# Patient Record
Sex: FEMALE | Race: WHITE | NOT HISPANIC OR LATINO | Employment: OTHER | URBAN - METROPOLITAN AREA
[De-identification: names, ages, dates, MRNs, and addresses within clinical notes are randomized per-mention and may not be internally consistent; named-entity substitution may affect disease eponyms.]

---

## 2017-04-05 ENCOUNTER — TRANSCRIBE ORDERS (OUTPATIENT)
Dept: ADMINISTRATIVE | Facility: HOSPITAL | Age: 62
End: 2017-04-05

## 2017-04-05 DIAGNOSIS — Z12.31 VISIT FOR SCREENING MAMMOGRAM: Primary | ICD-10-CM

## 2017-04-24 ENCOUNTER — HOSPITAL ENCOUNTER (OUTPATIENT)
Dept: MAMMOGRAPHY | Facility: HOSPITAL | Age: 62
Discharge: HOME | End: 2017-04-24
Attending: OBSTETRICS & GYNECOLOGY | Admitting: OBSTETRICS & GYNECOLOGY

## 2017-04-24 DIAGNOSIS — Z12.31 VISIT FOR SCREENING MAMMOGRAM: ICD-10-CM

## 2017-04-24 PROCEDURE — 77063 BREAST TOMOSYNTHESIS BI: CPT

## 2017-04-24 PROCEDURE — G0202 SCR MAMMO BI INCL CAD: HCPCS

## 2017-04-24 PROCEDURE — 77063 BREAST TOMOSYNTHESIS BI: CPT | Performed by: RADIOLOGY

## 2017-04-24 PROCEDURE — 77067 SCR MAMMO BI INCL CAD: CPT | Performed by: RADIOLOGY

## 2018-10-04 ENCOUNTER — TRANSCRIBE ORDERS (OUTPATIENT)
Dept: ADMINISTRATIVE | Facility: HOSPITAL | Age: 63
End: 2018-10-04

## 2018-10-04 DIAGNOSIS — Z12.31 VISIT FOR SCREENING MAMMOGRAM: Primary | ICD-10-CM

## 2018-11-16 ENCOUNTER — HOSPITAL ENCOUNTER (OUTPATIENT)
Dept: MAMMOGRAPHY | Facility: HOSPITAL | Age: 63
Discharge: HOME | End: 2018-11-16
Attending: OBSTETRICS & GYNECOLOGY | Admitting: OBSTETRICS & GYNECOLOGY

## 2018-11-16 DIAGNOSIS — Z12.31 VISIT FOR SCREENING MAMMOGRAM: ICD-10-CM

## 2018-11-16 PROCEDURE — 77067 SCR MAMMO BI INCL CAD: CPT | Performed by: RADIOLOGY

## 2018-11-16 PROCEDURE — 77063 BREAST TOMOSYNTHESIS BI: CPT | Performed by: RADIOLOGY

## 2018-11-16 PROCEDURE — 77067 SCR MAMMO BI INCL CAD: CPT

## 2018-11-16 PROCEDURE — 77063 BREAST TOMOSYNTHESIS BI: CPT

## 2020-02-10 ENCOUNTER — TRANSCRIBE ORDERS (OUTPATIENT)
Dept: ADMINISTRATIVE | Facility: HOSPITAL | Age: 65
End: 2020-02-10

## 2020-02-10 DIAGNOSIS — Z12.31 VISIT FOR SCREENING MAMMOGRAM: Primary | ICD-10-CM

## 2020-02-27 ENCOUNTER — TRANSCRIBE ORDERS (OUTPATIENT)
Dept: ADMINISTRATIVE | Facility: HOSPITAL | Age: 65
End: 2020-02-27

## 2020-02-27 DIAGNOSIS — Z13.820 SCREENING FOR OSTEOPOROSIS: Primary | ICD-10-CM

## 2020-03-04 ENCOUNTER — TRANSCRIBE ORDERS (OUTPATIENT)
Dept: LAB | Facility: HOSPITAL | Age: 65
End: 2020-03-04

## 2020-03-04 ENCOUNTER — LAB (OUTPATIENT)
Dept: LAB | Facility: HOSPITAL | Age: 65
End: 2020-03-04

## 2020-03-04 DIAGNOSIS — N95.1 SYMPTOMATIC MENOPAUSAL OR FEMALE CLIMACTERIC STATES: ICD-10-CM

## 2020-03-04 DIAGNOSIS — I10 ESSENTIAL HYPERTENSION, MALIGNANT: ICD-10-CM

## 2020-03-04 DIAGNOSIS — I51.9 MYXEDEMA HEART DISEASE: ICD-10-CM

## 2020-03-04 DIAGNOSIS — R73.9 BLOOD GLUCOSE ELEVATED: ICD-10-CM

## 2020-03-04 DIAGNOSIS — E66.8 OBESITY OF ENDOCRINE ORIGIN: ICD-10-CM

## 2020-03-04 DIAGNOSIS — E78.2 MIXED HYPERLIPIDEMIA: ICD-10-CM

## 2020-03-04 DIAGNOSIS — E03.9 MYXEDEMA HEART DISEASE: ICD-10-CM

## 2020-03-04 DIAGNOSIS — E66.8 OBESITY OF ENDOCRINE ORIGIN: Primary | ICD-10-CM

## 2020-03-04 LAB
25(OH)D3 SERPL-MCNC: 49.6 NG/ML (ref 30–100)
ALBUMIN SERPL-MCNC: 4.3 G/DL (ref 3.5–5.2)
ALBUMIN/GLOB SERPL: 1.7 G/DL
ALP SERPL-CCNC: 66 U/L (ref 39–117)
ALT SERPL W P-5'-P-CCNC: 17 U/L (ref 1–33)
ANION GAP SERPL CALCULATED.3IONS-SCNC: 13.7 MMOL/L (ref 5–15)
AST SERPL-CCNC: 20 U/L (ref 1–32)
BILIRUB SERPL-MCNC: 0.4 MG/DL (ref 0.2–1.2)
BUN BLD-MCNC: 15 MG/DL (ref 8–23)
BUN/CREAT SERPL: 22.7 (ref 7–25)
CALCIUM SPEC-SCNC: 9.8 MG/DL (ref 8.6–10.5)
CHLORIDE SERPL-SCNC: 102 MMOL/L (ref 98–107)
CHOLEST SERPL-MCNC: 239 MG/DL (ref 0–200)
CO2 SERPL-SCNC: 25.3 MMOL/L (ref 22–29)
CREAT BLD-MCNC: 0.66 MG/DL (ref 0.57–1)
DEPRECATED RDW RBC AUTO: 42 FL (ref 37–54)
ERYTHROCYTE [DISTWIDTH] IN BLOOD BY AUTOMATED COUNT: 12.1 % (ref 12.3–15.4)
ESTRADIOL SERPL HS-MCNC: 48.7 PG/ML
GFR SERPL CREATININE-BSD FRML MDRD: 90 ML/MIN/1.73
GLOBULIN UR ELPH-MCNC: 2.5 GM/DL
GLUCOSE BLD-MCNC: 89 MG/DL (ref 65–99)
HBA1C MFR BLD: 5.49 % (ref 4.8–5.6)
HCT VFR BLD AUTO: 39 % (ref 34–46.6)
HCYS SERPL-MCNC: 6.4 UMOL/L (ref 0–15)
HDLC SERPL-MCNC: 82 MG/DL (ref 40–60)
HGB BLD-MCNC: 13.7 G/DL (ref 12–15.9)
LDLC SERPL CALC-MCNC: 145 MG/DL (ref 0–100)
LDLC/HDLC SERPL: 1.77 {RATIO}
MCH RBC QN AUTO: 33.3 PG (ref 26.6–33)
MCHC RBC AUTO-ENTMCNC: 35.1 G/DL (ref 31.5–35.7)
MCV RBC AUTO: 94.9 FL (ref 79–97)
PLATELET # BLD AUTO: 280 10*3/MM3 (ref 140–450)
PMV BLD AUTO: 10 FL (ref 6–12)
POTASSIUM BLD-SCNC: 5.1 MMOL/L (ref 3.5–5.2)
PROGEST SERPL-MCNC: 0.26 NG/ML
PROT SERPL-MCNC: 6.8 G/DL (ref 6–8.5)
RBC # BLD AUTO: 4.11 10*6/MM3 (ref 3.77–5.28)
SODIUM BLD-SCNC: 141 MMOL/L (ref 136–145)
T3FREE SERPL-MCNC: 2.45 PG/ML (ref 2–4.4)
T4 FREE SERPL-MCNC: 1.32 NG/DL (ref 0.93–1.7)
TESTOST SERPL-MCNC: 18 NG/DL (ref 2.9–40.8)
TRIGL SERPL-MCNC: 60 MG/DL (ref 0–150)
TSH SERPL DL<=0.05 MIU/L-ACNC: 1.95 UIU/ML (ref 0.27–4.2)
VLDLC SERPL-MCNC: 12 MG/DL (ref 5–40)
WBC NRBC COR # BLD: 7.32 10*3/MM3 (ref 3.4–10.8)

## 2020-03-04 PROCEDURE — 36415 COLL VENOUS BLD VENIPUNCTURE: CPT | Performed by: OBSTETRICS & GYNECOLOGY

## 2020-03-04 PROCEDURE — 84443 ASSAY THYROID STIM HORMONE: CPT

## 2020-03-04 PROCEDURE — 84144 ASSAY OF PROGESTERONE: CPT | Performed by: OBSTETRICS & GYNECOLOGY

## 2020-03-04 PROCEDURE — 83525 ASSAY OF INSULIN: CPT

## 2020-03-04 PROCEDURE — 86376 MICROSOMAL ANTIBODY EACH: CPT

## 2020-03-04 PROCEDURE — 86800 THYROGLOBULIN ANTIBODY: CPT

## 2020-03-04 PROCEDURE — 80053 COMPREHEN METABOLIC PANEL: CPT

## 2020-03-04 PROCEDURE — 83036 HEMOGLOBIN GLYCOSYLATED A1C: CPT | Performed by: OBSTETRICS & GYNECOLOGY

## 2020-03-04 PROCEDURE — 83721 ASSAY OF BLOOD LIPOPROTEIN: CPT | Performed by: OBSTETRICS & GYNECOLOGY

## 2020-03-04 PROCEDURE — 83090 ASSAY OF HOMOCYSTEINE: CPT

## 2020-03-04 PROCEDURE — 82627 DEHYDROEPIANDROSTERONE: CPT

## 2020-03-04 PROCEDURE — 82679 ASSAY OF ESTRONE: CPT

## 2020-03-04 PROCEDURE — 85027 COMPLETE CBC AUTOMATED: CPT | Performed by: OBSTETRICS & GYNECOLOGY

## 2020-03-04 PROCEDURE — 82306 VITAMIN D 25 HYDROXY: CPT | Performed by: OBSTETRICS & GYNECOLOGY

## 2020-03-04 PROCEDURE — 82670 ASSAY OF TOTAL ESTRADIOL: CPT

## 2020-03-04 PROCEDURE — 84481 FREE ASSAY (FT-3): CPT

## 2020-03-04 PROCEDURE — 80061 LIPID PANEL: CPT

## 2020-03-04 PROCEDURE — 84439 ASSAY OF FREE THYROXINE: CPT | Performed by: OBSTETRICS & GYNECOLOGY

## 2020-03-04 PROCEDURE — 84402 ASSAY OF FREE TESTOSTERONE: CPT

## 2020-03-04 PROCEDURE — 84482 T3 REVERSE: CPT

## 2020-03-04 PROCEDURE — 84403 ASSAY OF TOTAL TESTOSTERONE: CPT

## 2020-03-06 LAB
DHEA-S SERPL-MCNC: 165.5 UG/DL (ref 29.4–220.5)
T3REVERSE SERPL-MCNC: NORMAL PG/ML
TESTOST FREE SERPL-MCNC: NORMAL PG/ML
THYROGLOB AB SERPL-ACNC: <1 IU/ML (ref 0–0.9)
THYROPEROXIDASE AB SERPL-ACNC: 8 IU/ML (ref 0–34)

## 2020-03-08 LAB — INSULIN SERPL-ACNC: 1.6 UIU/ML

## 2020-03-09 LAB — ESTRONE SERPL-MCNC: 97 PG/ML

## 2020-03-10 LAB — REF LAB TEST RESULTS: NORMAL

## 2020-04-17 ENCOUNTER — APPOINTMENT (OUTPATIENT)
Dept: MAMMOGRAPHY | Facility: HOSPITAL | Age: 65
End: 2020-04-17

## 2020-04-29 ENCOUNTER — APPOINTMENT (OUTPATIENT)
Dept: MAMMOGRAPHY | Facility: HOSPITAL | Age: 65
End: 2020-04-29

## 2020-05-01 ENCOUNTER — APPOINTMENT (OUTPATIENT)
Dept: BONE DENSITY | Facility: HOSPITAL | Age: 65
End: 2020-05-01

## 2020-07-08 ENCOUNTER — HOSPITAL ENCOUNTER (OUTPATIENT)
Dept: MAMMOGRAPHY | Facility: HOSPITAL | Age: 65
Discharge: HOME | End: 2020-07-08
Admitting: OBSTETRICS & GYNECOLOGY

## 2020-07-08 DIAGNOSIS — Z12.31 VISIT FOR SCREENING MAMMOGRAM: ICD-10-CM

## 2020-07-08 PROCEDURE — 77067 SCR MAMMO BI INCL CAD: CPT | Performed by: RADIOLOGY

## 2020-07-08 PROCEDURE — 77063 BREAST TOMOSYNTHESIS BI: CPT | Performed by: RADIOLOGY

## 2020-07-08 PROCEDURE — 77063 BREAST TOMOSYNTHESIS BI: CPT

## 2020-07-08 PROCEDURE — 77067 SCR MAMMO BI INCL CAD: CPT

## 2020-08-06 ENCOUNTER — HOSPITAL ENCOUNTER (OUTPATIENT)
Dept: BONE DENSITY | Facility: HOSPITAL | Age: 65
Discharge: HOME | End: 2020-08-06
Admitting: OBSTETRICS & GYNECOLOGY

## 2020-08-06 DIAGNOSIS — Z13.820 SCREENING FOR OSTEOPOROSIS: ICD-10-CM

## 2020-08-06 PROCEDURE — 77080 DXA BONE DENSITY AXIAL: CPT

## 2020-12-15 ENCOUNTER — LAB (OUTPATIENT)
Dept: LAB | Facility: HOSPITAL | Age: 65
End: 2020-12-15

## 2020-12-15 ENCOUNTER — TRANSCRIBE ORDERS (OUTPATIENT)
Dept: LAB | Facility: HOSPITAL | Age: 65
End: 2020-12-15

## 2020-12-15 DIAGNOSIS — N95.1 SYMPTOMATIC MENOPAUSAL OR FEMALE CLIMACTERIC STATES: ICD-10-CM

## 2020-12-15 DIAGNOSIS — N95.1 SYMPTOMATIC MENOPAUSAL OR FEMALE CLIMACTERIC STATES: Primary | ICD-10-CM

## 2020-12-15 LAB — TESTOST SERPL-MCNC: 31.5 NG/DL (ref 2.9–40.8)

## 2020-12-15 PROCEDURE — 36415 COLL VENOUS BLD VENIPUNCTURE: CPT

## 2020-12-15 PROCEDURE — 84403 ASSAY OF TOTAL TESTOSTERONE: CPT

## 2021-03-17 ENCOUNTER — TRANSCRIBE ORDERS (OUTPATIENT)
Dept: LAB | Facility: HOSPITAL | Age: 66
End: 2021-03-17

## 2021-03-17 ENCOUNTER — LAB (OUTPATIENT)
Dept: LAB | Facility: HOSPITAL | Age: 66
End: 2021-03-17

## 2021-03-17 DIAGNOSIS — N95.1 SYMPTOMATIC MENOPAUSAL OR FEMALE CLIMACTERIC STATES: ICD-10-CM

## 2021-03-17 DIAGNOSIS — N95.1 SYMPTOMATIC MENOPAUSAL OR FEMALE CLIMACTERIC STATES: Primary | ICD-10-CM

## 2021-03-17 LAB
ESTRADIOL SERPL HS-MCNC: 50.4 PG/ML
TESTOST SERPL-MCNC: 22.6 NG/DL (ref 2.9–40.8)

## 2021-03-17 PROCEDURE — 36415 COLL VENOUS BLD VENIPUNCTURE: CPT | Performed by: OBSTETRICS & GYNECOLOGY

## 2021-03-17 PROCEDURE — 84403 ASSAY OF TOTAL TESTOSTERONE: CPT

## 2021-03-17 PROCEDURE — 82670 ASSAY OF TOTAL ESTRADIOL: CPT | Performed by: OBSTETRICS & GYNECOLOGY

## 2021-03-23 ENCOUNTER — TRANSCRIBE ORDERS (OUTPATIENT)
Dept: LAB | Facility: HOSPITAL | Age: 66
End: 2021-03-23

## 2021-03-23 ENCOUNTER — LAB (OUTPATIENT)
Dept: LAB | Facility: HOSPITAL | Age: 66
End: 2021-03-23

## 2021-03-23 DIAGNOSIS — N95.1 SYMPTOMATIC MENOPAUSAL OR FEMALE CLIMACTERIC STATES: Primary | ICD-10-CM

## 2021-03-23 PROCEDURE — 82670 ASSAY OF TOTAL ESTRADIOL: CPT | Performed by: OBSTETRICS & GYNECOLOGY

## 2021-03-23 PROCEDURE — 36415 COLL VENOUS BLD VENIPUNCTURE: CPT | Performed by: OBSTETRICS & GYNECOLOGY

## 2021-03-24 LAB — ESTRADIOL SERPL HS-MCNC: 47.7 PG/ML

## 2021-07-28 ENCOUNTER — TRANSCRIBE ORDERS (OUTPATIENT)
Dept: ADMINISTRATIVE | Facility: HOSPITAL | Age: 66
End: 2021-07-28

## 2021-07-28 DIAGNOSIS — Z12.31 VISIT FOR SCREENING MAMMOGRAM: Primary | ICD-10-CM

## 2021-08-13 ENCOUNTER — HOSPITAL ENCOUNTER (OUTPATIENT)
Dept: MAMMOGRAPHY | Facility: HOSPITAL | Age: 66
Discharge: HOME | End: 2021-08-13
Admitting: OBSTETRICS & GYNECOLOGY

## 2021-08-13 DIAGNOSIS — Z12.31 VISIT FOR SCREENING MAMMOGRAM: ICD-10-CM

## 2021-08-13 PROCEDURE — 77063 BREAST TOMOSYNTHESIS BI: CPT

## 2021-08-13 PROCEDURE — 77067 SCR MAMMO BI INCL CAD: CPT

## 2021-08-13 PROCEDURE — 77063 BREAST TOMOSYNTHESIS BI: CPT | Performed by: RADIOLOGY

## 2021-08-13 PROCEDURE — 77067 SCR MAMMO BI INCL CAD: CPT | Performed by: RADIOLOGY

## 2022-07-19 ENCOUNTER — TRANSCRIBE ORDERS (OUTPATIENT)
Dept: ADMINISTRATIVE | Facility: HOSPITAL | Age: 67
End: 2022-07-19

## 2022-07-19 DIAGNOSIS — Z12.31 VISIT FOR SCREENING MAMMOGRAM: Primary | ICD-10-CM

## 2022-08-23 ENCOUNTER — HOSPITAL ENCOUNTER (OUTPATIENT)
Dept: MAMMOGRAPHY | Facility: HOSPITAL | Age: 67
Discharge: HOME | End: 2022-08-23
Admitting: OBSTETRICS & GYNECOLOGY

## 2022-08-23 DIAGNOSIS — Z12.31 VISIT FOR SCREENING MAMMOGRAM: ICD-10-CM

## 2022-08-23 PROCEDURE — 77063 BREAST TOMOSYNTHESIS BI: CPT

## 2022-08-23 PROCEDURE — 77067 SCR MAMMO BI INCL CAD: CPT

## 2022-08-23 PROCEDURE — 77067 SCR MAMMO BI INCL CAD: CPT | Performed by: RADIOLOGY

## 2022-08-23 PROCEDURE — 77063 BREAST TOMOSYNTHESIS BI: CPT | Performed by: RADIOLOGY

## 2023-03-20 ENCOUNTER — OFFICE (OUTPATIENT)
Dept: URBAN - METROPOLITAN AREA PATHOLOGY 4 | Facility: PATHOLOGY | Age: 68
End: 2023-03-20

## 2023-03-20 ENCOUNTER — AMBULATORY SURGICAL CENTER (OUTPATIENT)
Dept: URBAN - METROPOLITAN AREA SURGERY 10 | Facility: SURGERY | Age: 68
End: 2023-03-20

## 2023-03-20 DIAGNOSIS — K64.1 SECOND DEGREE HEMORRHOIDS: ICD-10-CM

## 2023-03-20 DIAGNOSIS — D12.0 BENIGN NEOPLASM OF CECUM: ICD-10-CM

## 2023-03-20 DIAGNOSIS — K57.30 DIVERTICULOSIS OF LARGE INTESTINE WITHOUT PERFORATION OR ABS: ICD-10-CM

## 2023-03-20 DIAGNOSIS — D12.5 BENIGN NEOPLASM OF SIGMOID COLON: ICD-10-CM

## 2023-03-20 DIAGNOSIS — Z12.11 ENCOUNTER FOR SCREENING FOR MALIGNANT NEOPLASM OF COLON: ICD-10-CM

## 2023-03-20 PROCEDURE — 88305 TISSUE EXAM BY PATHOLOGIST: CPT | Performed by: INTERNAL MEDICINE

## 2023-03-20 PROCEDURE — 45385 COLONOSCOPY W/LESION REMOVAL: CPT | Mod: PT | Performed by: INTERNAL MEDICINE

## 2023-03-22 PROBLEM — Z12.11 ENCOUNTER FOR COLONOSCOPY DUE TO HISTORY OF ADENOMATOUS COLONIC POLYPS: Status: ACTIVE | Noted: 2023-03-22

## 2023-07-26 ENCOUNTER — TRANSCRIBE ORDERS (OUTPATIENT)
Dept: ADMINISTRATIVE | Facility: HOSPITAL | Age: 68
End: 2023-07-26
Payer: MEDICARE

## 2023-07-26 DIAGNOSIS — Z12.31 VISIT FOR SCREENING MAMMOGRAM: Primary | ICD-10-CM

## 2023-08-28 ENCOUNTER — HOSPITAL ENCOUNTER (OUTPATIENT)
Dept: MAMMOGRAPHY | Facility: HOSPITAL | Age: 68
Discharge: HOME | End: 2023-08-28
Admitting: OBSTETRICS & GYNECOLOGY
Payer: MEDICARE

## 2023-08-28 DIAGNOSIS — Z12.31 VISIT FOR SCREENING MAMMOGRAM: ICD-10-CM

## 2023-08-28 PROCEDURE — 77063 BREAST TOMOSYNTHESIS BI: CPT

## 2023-08-28 PROCEDURE — 77067 SCR MAMMO BI INCL CAD: CPT

## 2024-08-19 ENCOUNTER — TRANSCRIBE ORDERS (OUTPATIENT)
Dept: ADMINISTRATIVE | Facility: HOSPITAL | Age: 69
End: 2024-08-19
Payer: MEDICARE

## 2024-08-19 DIAGNOSIS — Z12.31 SCREENING MAMMOGRAM FOR BREAST CANCER: Primary | ICD-10-CM

## 2024-08-21 LAB
NCCN CRITERIA FLAG: NORMAL
TYRER CUZICK SCORE: 5.4

## 2024-10-08 ENCOUNTER — HOSPITAL ENCOUNTER (OUTPATIENT)
Dept: MAMMOGRAPHY | Facility: HOSPITAL | Age: 69
Discharge: HOME | End: 2024-10-08
Payer: MEDICARE

## 2024-10-08 DIAGNOSIS — Z12.31 SCREENING MAMMOGRAM FOR BREAST CANCER: ICD-10-CM

## 2024-11-21 ENCOUNTER — HOSPITAL ENCOUNTER (OUTPATIENT)
Dept: MAMMOGRAPHY | Facility: HOSPITAL | Age: 69
Discharge: HOME | End: 2024-11-21
Admitting: OBSTETRICS & GYNECOLOGY
Payer: MEDICARE

## 2024-11-21 PROCEDURE — 77067 SCR MAMMO BI INCL CAD: CPT

## 2024-11-21 PROCEDURE — 77063 BREAST TOMOSYNTHESIS BI: CPT

## 2025-01-23 NOTE — PROGRESS NOTES
[facility] Cardiology  [address], [address]  [place], [place], [address] [phone]  WWW.[facility]           OUTPATIENT CLINIC CONSULTATION NOTE    Patient care team:  Patient Care Team:  Gloria Jovel PA as PCP - General (Physician Assistant)  Tony Hilario MD as PCP - Family Medicine  Rod Malone MD as Consulting Physician (Cardiology)  Christina Carr APRN as Nurse Practitioner (Cardiology)    Requesting Provider and Reason for consultation: The patient is being seen today at the request of ARTURO Nelson for palpitations.     Subjective:   Chief complaint:   Chief Complaint   Patient presents with    Palpitations    Establish Care         Alicia Vaca is a 69 y.o. female.  Cardiac focused problem list:  Palpitations, PVCs, PSVT  Initially noted after COVID infection 9/2024  Holter monitor, 7 day, 10/13/2024:  Predominant rhythm is NSR. 7 episodes of atrial tachycardia with longest lasting 10 beats.  1 episode of ventricular tachycardia lasting 4 beats. PVC burden 2.9%.   Hypertension   Lumbar degenerative disc disease   Depression     HPI:    Patient presents today in consultation for palpitations.     First noted after COVID infection 9/2024.  Placed on heart monitor by PCP.  Started on Toprol.  It helped her symptoms.  Some recurrence in December.  Has eased off again.  Does not monitor blood pressure closely but knows that it has been higher and PCP has been uptitrating meds.  Walks 4-5 times a week for 45 minutes without cardiopulmonary symptoms.  Denies chest pain or unusual trouble breathing    No other notable cardiac testing or diagnoses.  Father had TIA/strokes.  Remote smoking, quit 40 years ago.        Review of Systems:  As noted above in the HPI    PFSH:  Patient Active Problem List   Diagnosis    Palpitations    PVC's (premature ventricular contractions)    Paroxysmal SVT (supraventricular tachycardia)         Current Outpatient  Medications:     Berberine Chloride (BERBERINE HCI PO), Take 1 capsule by mouth Daily., Disp: , Rfl:     Calcium 250 MG capsule, Take 1 capsule by mouth Daily., Disp: , Rfl:     Chromium 200 MCG capsule, Take  by mouth., Disp: , Rfl:     CINNAMON PO, Take  by mouth., Disp: , Rfl:     Digestive Aids Mixture (DIGESTION GB PO), Take  by mouth Daily., Disp: , Rfl:     DIGESTIVE ENZYMES PO, Take 2 tablets by mouth 3 times a day., Disp: , Rfl:     estradiol (CLIMARA) 0.075 MG/24HR patch, Place 1 patch on the skin as directed by provider 2 (Two) Times a Week., Disp: , Rfl:     estradiol (ESTRACE) 0.1 MG/GM vaginal cream, Insert 2 g into the vagina 2 (Two) Times a Week., Disp: , Rfl:     folic acid-vit B6-vit B12 (FOLTABS) 0.8-10-0.115 MG tablet tablet, Take 1 tablet by mouth Daily., Disp: , Rfl:     MICHAEL PO, Take 1 capsule by mouth Daily., Disp: , Rfl:     Homeopathic Products (RAUWOLFIA COMPOSITUM PO), Take 1 capsule by mouth Daily., Disp: , Rfl:     lisinopril (PRINIVIL,ZESTRIL) 40 MG tablet, Take 1 tablet by mouth Every Night., Disp: , Rfl:     MAGNESIUM CHLORIDE PO, Take 2 capsules by mouth Daily., Disp: , Rfl:     meloxicam (MOBIC) 7.5 MG tablet, Take 1 tablet by mouth Every Night., Disp: , Rfl:     MILK THISTLE PO, Take 1 capsule by mouth Daily., Disp: , Rfl:     multivitamin with minerals tablet tablet, Take 1 tablet by mouth Daily., Disp: , Rfl:     NON FORMULARY, , Disp: , Rfl:     NON FORMULARY, , Disp: , Rfl:     NON FORMULARY, , Disp: , Rfl:     NON FORMULARY, , Disp: , Rfl:     NON FORMULARY, , Disp: , Rfl:     NON FORMULARY, , Disp: , Rfl:     NON FORMULARY, , Disp: , Rfl:     NON FORMULARY, Daily., Disp: , Rfl:     NON FORMULARY, Daily., Disp: , Rfl:     NON FORMULARY, Daily., Disp: , Rfl:     NON FORMULARY, Daily., Disp: , Rfl:     NON FORMULARY, Every Night., Disp: , Rfl:     NON FORMULARY, Every Night., Disp: , Rfl:     Omega-3 Fatty Acids (fish oil) 1000 MG capsule capsule, Take 1 capsule by mouth  "Daily With Breakfast., Disp: , Rfl:     Omega-3 Fatty Acids (SUPER TWIN EPA/DHA PO), Take 2 capsules by mouth Daily., Disp: , Rfl:     Probiotic Product (PROBIOTIC DAILY PO), Take 1 capsule by mouth., Disp: , Rfl:     Specialty Vitamins Products (INFLAMEX PO), Take  by mouth., Disp: , Rfl:     THYROID PO, Take 1 capsule by mouth., Disp: , Rfl:     vitamin D (ERGOCALCIFEROL) 1.25 MG (08363 UT) capsule capsule, Take 1 capsule by mouth Daily., Disp: , Rfl:     Zinc Sulfate (ZINC 15 PO), Take 1 capsule by mouth Daily., Disp: , Rfl:     nebivolol (Bystolic) 10 MG tablet, Take 1 tablet by mouth Daily., Disp: 30 tablet, Rfl: 11    Allergies   Allergen Reactions    Penicillins Rash       Social History     Socioeconomic History    Marital status:    Tobacco Use    Smoking status: Former     Current packs/day: 0.00     Average packs/day: 1 pack/day for 13.0 years (13.0 ttl pk-yrs)     Types: Cigarettes     Start date:      Quit date:      Years since quittin.0     Passive exposure: Never    Smokeless tobacco: Never    Tobacco comments:     Quit 40 years ago   Vaping Use    Vaping status: Never Used   Substance and Sexual Activity    Alcohol use: Yes     Alcohol/week: 6.0 standard drinks of alcohol     Types: 6 Glasses of wine per week    Drug use: Never    Sexual activity: Not Currently     Partners: Male     Birth control/protection: Hysterectomy     Family History   Problem Relation Age of Onset    Asthma Mother     Hypertension Mother     Stroke Father     Hypertension Father     Breast cancer Sister 50    Hypertension Sister     Hypertension Brother     Ovarian cancer Neg Hx          Objective:   Physical Exam:  /88 (BP Location: Right arm, Patient Position: Sitting, Cuff Size: Adult)   Pulse 59   Ht 165.1 cm (65\")   Wt 68.4 kg (150 lb 12.8 oz)   SpO2 98%   BMI 25.09 kg/m²   CONSTITUTIONAL: No acute distress  RESPIRATORY: Normal effort. Clear to auscultation bilaterally without wheezing or " "rales  CARDIOVASCULAR: Regular rate and rhythm with normal S1 and S2. Without murmur.  PERIPHERAL VASCULAR: No carotid bruit bilaterally.  Normal radial pulse.     Labs:  Labs reviewed by myself    Lab Results   Component Value Date    CHOL 239 (H) 03/04/2020     Lab Results   Component Value Date    TRIG 60 03/04/2020     Lab Results   Component Value Date    HDL 82 (H) 03/04/2020     Lab Results   Component Value Date     (H) 03/04/2020     No components found for: \"LDLDIRECTC\"    PCP labs, 10/07/2024  CBC:  WBC 9.0, Hgb 13.2, Hct 40.3, plt 398  CMP:  Na 138, K 4.0, BUN 11, creatinine 0.51, AST 16, ALT 11,   Hemoglobin A1c:  5.4   Thyroid panel:  free T4 1.4, TSH 1.62  Lipid panel:  , HDL 74, , TG 72    Diagnostic Data:      ECG 12 Lead    Date/Time: 1/27/2025 11:58 AM  Performed by: Rod Malone MD    Authorized by: Rod Malone MD  Previous ECG: no previous ECG available  Rhythm: sinus rhythm              Assessment and Plan:     Palpitations  PVCs, NSVT  Paroxysmal SVT  (brief runs of atrial tach)  -Initial provocation following COVID infection.  Heart monitor reviewed.   -Rule out structural heart disease with an echocardiogram  -Continue beta-blocker with changes as noted below    Hypertension  -Discontinuing amlodipine as well as Toprol and starting nebivolol 10 mg once daily to consolidate medications.  Uptitrate nebivolol if needed for BP and if heart rate allows  -Continue lisinopril  -Patient to call us in a couple weeks with update on BP    Dyslipidemia  -Discussed risk versus benefits of coronary artery calcium scoring.  Patient wishes to proceed.  With that said, has shared that there is some reluctance in starting medicines like statins  -Would consider adding Tricor (brother uses it) if with notable coronary artery calcium and encourage other avenues of risk factor modification     - Return in about 3 months (around 4/27/2025) for Next follow up, or sooner if needed.      "

## 2025-01-27 ENCOUNTER — OFFICE VISIT (OUTPATIENT)
Dept: CARDIOLOGY | Facility: CLINIC | Age: 70
End: 2025-01-27
Payer: MEDICARE

## 2025-01-27 VITALS
OXYGEN SATURATION: 98 % | BODY MASS INDEX: 25.12 KG/M2 | HEART RATE: 59 BPM | HEIGHT: 65 IN | WEIGHT: 150.8 LBS | SYSTOLIC BLOOD PRESSURE: 138 MMHG | DIASTOLIC BLOOD PRESSURE: 88 MMHG

## 2025-01-27 DIAGNOSIS — I47.10 PAROXYSMAL SVT (SUPRAVENTRICULAR TACHYCARDIA): ICD-10-CM

## 2025-01-27 DIAGNOSIS — I49.3 PVC'S (PREMATURE VENTRICULAR CONTRACTIONS): ICD-10-CM

## 2025-01-27 DIAGNOSIS — R00.2 PALPITATIONS: Primary | ICD-10-CM

## 2025-01-27 PROCEDURE — 99204 OFFICE O/P NEW MOD 45 MIN: CPT | Performed by: INTERNAL MEDICINE

## 2025-01-27 PROCEDURE — 93000 ELECTROCARDIOGRAM COMPLETE: CPT | Performed by: INTERNAL MEDICINE

## 2025-01-27 RX ORDER — ESTRADIOL 0.07 MG/D
1 PATCH TRANSDERMAL 2 TIMES WEEKLY
COMMUNITY

## 2025-01-27 RX ORDER — ESTRADIOL 0.1 MG/G
2 CREAM VAGINAL 2 TIMES WEEKLY
COMMUNITY

## 2025-01-27 RX ORDER — LISINOPRIL 40 MG/1
40 TABLET ORAL NIGHTLY
COMMUNITY

## 2025-01-27 RX ORDER — METOPROLOL SUCCINATE 25 MG/1
25 TABLET, EXTENDED RELEASE ORAL NIGHTLY
COMMUNITY
End: 2025-01-27

## 2025-01-27 RX ORDER — CHLORAL HYDRATE 500 MG
1000 CAPSULE ORAL
COMMUNITY

## 2025-01-27 RX ORDER — NEBIVOLOL 10 MG/1
10 TABLET ORAL DAILY
Qty: 30 TABLET | Refills: 11 | Status: SHIPPED | OUTPATIENT
Start: 2025-01-27

## 2025-01-27 RX ORDER — MULTIPLE VITAMINS W/ MINERALS TAB 9MG-400MCG
1 TAB ORAL DAILY
COMMUNITY

## 2025-01-27 RX ORDER — ERGOCALCIFEROL 1.25 MG/1
50000 CAPSULE, LIQUID FILLED ORAL DAILY
COMMUNITY

## 2025-01-27 RX ORDER — MELOXICAM 7.5 MG/1
7.5 TABLET ORAL NIGHTLY
COMMUNITY

## 2025-01-27 RX ORDER — AMLODIPINE BESYLATE 5 MG/1
5 TABLET ORAL NIGHTLY
COMMUNITY
End: 2025-01-27

## 2025-02-14 ENCOUNTER — TELEPHONE (OUTPATIENT)
Dept: CARDIOLOGY | Facility: CLINIC | Age: 70
End: 2025-02-14
Payer: MEDICARE

## 2025-02-14 NOTE — TELEPHONE ENCOUNTER
Patient called to let you know her BP readings.    2/5/2025   /73, HR 57  /73 HR 55    2/6/2025  /77, HR 60    2/7/2025  /82, HR 57  /88, HR 55    2/8/2025  141/83, HR 59    2/9/2025  149/71, HR 59    2/12/2025  /64  /59    2/13/2025  /60  /63    2/14/2025  148/68    Discontinued amlodipine and Toprol. Started nebivolol 10 mg daily.   Also on lisinopril 40 mg daily.     Last labs 10/7/2024- creat 0.51, BUN 11, K 4.0, Na 138

## 2025-02-17 RX ORDER — AMLODIPINE BESYLATE 5 MG/1
5 TABLET ORAL DAILY
COMMUNITY

## 2025-02-17 NOTE — TELEPHONE ENCOUNTER
Per MJS- add amlodipine back. Spoke with the patient. She is aware. She will keep a BP log and update us next week.

## 2025-02-24 NOTE — TELEPHONE ENCOUNTER
Pt called to report updated blood pressures    2/18: 135/71 (63), 114/65 (63)  2/19: 126/67 (62), 146/76 (57)  2/20: 130/73 (57), 122/68 (57)  2/21: 118/72 (61), 131/75 (56)  2/22: 122/71 (60), 120/67 (59)  2/23: 128/73 (56, 141/80 (54)  2/24 121/68 (63)     Let patient know her BP was reasonable to continue same medication therapy. She should let us know if her BP increases. Pt agreeable.     Pt notified of CAC results.

## 2025-03-05 ENCOUNTER — HOSPITAL ENCOUNTER (OUTPATIENT)
Dept: CARDIOLOGY | Facility: HOSPITAL | Age: 70
Discharge: HOME | End: 2025-03-05
Admitting: INTERNAL MEDICINE
Payer: MEDICARE

## 2025-03-05 ENCOUNTER — TELEPHONE (OUTPATIENT)
Dept: CARDIOLOGY | Facility: CLINIC | Age: 70
End: 2025-03-05

## 2025-03-05 VITALS — BODY MASS INDEX: 24.99 KG/M2 | WEIGHT: 150 LBS | HEIGHT: 65 IN

## 2025-03-05 DIAGNOSIS — I49.3 PVC'S (PREMATURE VENTRICULAR CONTRACTIONS): ICD-10-CM

## 2025-03-05 DIAGNOSIS — R00.2 PALPITATIONS: ICD-10-CM

## 2025-03-05 DIAGNOSIS — I47.10 PAROXYSMAL SVT (SUPRAVENTRICULAR TACHYCARDIA): ICD-10-CM

## 2025-03-05 LAB
ASCENDING AORTA: 3.1 CM
AV MEAN PRESS GRAD SYS DOP V1V2: 4 MMHG
AV VMAX SYS DOP: 127 CM/SEC
BH CV ECHO MEAS - AO MAX PG: 6.5 MMHG
BH CV ECHO MEAS - AO ROOT DIAM: 3.1 CM
BH CV ECHO MEAS - AO V2 VTI: 30.9 CM
BH CV ECHO MEAS - AVA(I,D): 1.71 CM2
BH CV ECHO MEAS - EDV(CUBED): 54.9 ML
BH CV ECHO MEAS - EDV(MOD-SP2): 95.5 ML
BH CV ECHO MEAS - EDV(MOD-SP4): 92 ML
BH CV ECHO MEAS - EF(MOD-SP2): 60.5 %
BH CV ECHO MEAS - EF(MOD-SP4): 62.2 %
BH CV ECHO MEAS - ESV(CUBED): 17.6 ML
BH CV ECHO MEAS - ESV(MOD-SP2): 37.7 ML
BH CV ECHO MEAS - ESV(MOD-SP4): 34.8 ML
BH CV ECHO MEAS - FS: 31.6 %
BH CV ECHO MEAS - IVS/LVPW: 1 CM
BH CV ECHO MEAS - IVSD: 1 CM
BH CV ECHO MEAS - LA DIMENSION: 4.1 CM
BH CV ECHO MEAS - LAT PEAK E' VEL: 12 CM/SEC
BH CV ECHO MEAS - LV DIASTOLIC VOL/BSA (35-75): 52.6 CM2
BH CV ECHO MEAS - LV MASS(C)D: 117.3 GRAMS
BH CV ECHO MEAS - LV MAX PG: 2.6 MMHG
BH CV ECHO MEAS - LV MEAN PG: 1.5 MMHG
BH CV ECHO MEAS - LV SYSTOLIC VOL/BSA (12-30): 19.9 CM2
BH CV ECHO MEAS - LV V1 MAX: 80.2 CM/SEC
BH CV ECHO MEAS - LV V1 VTI: 18.6 CM
BH CV ECHO MEAS - LVIDD: 3.8 CM
BH CV ECHO MEAS - LVIDS: 2.6 CM
BH CV ECHO MEAS - LVOT AREA: 2.8 CM2
BH CV ECHO MEAS - LVOT DIAM: 1.9 CM
BH CV ECHO MEAS - LVPWD: 1 CM
BH CV ECHO MEAS - MED PEAK E' VEL: 7.3 CM/SEC
BH CV ECHO MEAS - MV A MAX VEL: 39.8 CM/SEC
BH CV ECHO MEAS - MV DEC SLOPE: 287 CM/SEC2
BH CV ECHO MEAS - MV DEC TIME: 0.25 SEC
BH CV ECHO MEAS - MV E MAX VEL: 70.7 CM/SEC
BH CV ECHO MEAS - MV E/A: 1.78
BH CV ECHO MEAS - MV P1/2T: 91.2 MSEC
BH CV ECHO MEAS - MVA(P1/2T): 2.41 CM2
BH CV ECHO MEAS - PA ACC TIME: 0.17 SEC
BH CV ECHO MEAS - PA V2 MAX: 90 CM/SEC
BH CV ECHO MEAS - PAPD(PI EDV): 3 MMHG
BH CV ECHO MEAS - PI END-D VEL: 88.8 CM/SEC
BH CV ECHO MEAS - RAP SYSTOLE: 3 MMHG
BH CV ECHO MEAS - RVSP: 28 MMHG
BH CV ECHO MEAS - SV(LVOT): 52.7 ML
BH CV ECHO MEAS - SV(MOD-SP2): 57.8 ML
BH CV ECHO MEAS - SV(MOD-SP4): 57.2 ML
BH CV ECHO MEAS - SVI(LVOT): 30.1 ML/M2
BH CV ECHO MEAS - SVI(MOD-SP2): 33 ML/M2
BH CV ECHO MEAS - SVI(MOD-SP4): 32.7 ML/M2
BH CV ECHO MEAS - TR MAX PG: 25.4 MMHG
BH CV ECHO MEAS - TR MAX VEL: 252 CM/SEC
BH CV ECHO MEASUREMENTS AVERAGE E/E' RATIO: 7.33
BH CV VAS BP LEFT ARM: NORMAL MMHG
BH CV XLRA - RV BASE: 4.3 CM
BH CV XLRA - RV LENGTH: 6.8 CM
BH CV XLRA - RV MID: 3.5 CM
BH CV XLRA - TDI S': 11.7 CM/SEC
IVRT: 77 MS
LEFT ATRIUM VOLUME INDEX: 42 ML/M2
LV EF 2D ECHO EST: 59 %
LV EF BIPLANE MOD: 59.1 %

## 2025-03-05 PROCEDURE — 93306 TTE W/DOPPLER COMPLETE: CPT

## 2025-03-05 NOTE — TELEPHONE ENCOUNTER
----- Message from Rod Malone sent at 3/5/2025  1:24 PM EST -----  No significant findings on heart ultrasound.  Pump is strong.  Dilated atria, likely cause of her palpitations.  Continue beta-blocker and blood pressure management.  Treatment for this is medical management and lifestyle modifications  ----- Message -----  From: Rod Malone MD  Sent: 3/5/2025   1:07 PM EST  To: Rod Malone MD

## 2025-03-11 ENCOUNTER — TELEPHONE (OUTPATIENT)
Dept: CARDIOLOGY | Facility: CLINIC | Age: 70
End: 2025-03-11
Payer: MEDICARE

## 2025-03-11 NOTE — TELEPHONE ENCOUNTER
3/4: 117/67   140/78   3/5: 114/70       118/72   138/78   108/66     112/64  3/6: 136/79   125/72      3/7: 118/65   115/76   138/76  3/8: 134/69  3/9: 146/76  123/70     3/10: 128/68     Pt notified to continue current plan. Pt has no further questions at this time.

## 2025-03-21 ENCOUNTER — TELEPHONE (OUTPATIENT)
Dept: CARDIOLOGY | Facility: CLINIC | Age: 70
End: 2025-03-21
Payer: MEDICARE

## 2025-03-21 NOTE — TELEPHONE ENCOUNTER
Pt called to report BP numbers.     126/66 (58)  144/72 (58)  134/69 (62)  148/79 (64)  150/79 (62)     140/79 (65)  130/90 (61)    Pt taking medications as prescribed. Pt going to continue to monitor and report back next week.

## 2025-06-02 ENCOUNTER — TELEPHONE (OUTPATIENT)
Dept: CARDIOLOGY | Facility: CLINIC | Age: 70
End: 2025-06-02
Payer: MEDICARE

## 2025-06-02 ENCOUNTER — OFFICE VISIT (OUTPATIENT)
Dept: CARDIOLOGY | Facility: CLINIC | Age: 70
End: 2025-06-02
Payer: MEDICARE

## 2025-06-02 VITALS
OXYGEN SATURATION: 99 % | HEART RATE: 59 BPM | DIASTOLIC BLOOD PRESSURE: 70 MMHG | SYSTOLIC BLOOD PRESSURE: 130 MMHG | BODY MASS INDEX: 25.33 KG/M2 | WEIGHT: 152 LBS | HEIGHT: 65 IN

## 2025-06-02 DIAGNOSIS — I49.3 PVC'S (PREMATURE VENTRICULAR CONTRACTIONS): ICD-10-CM

## 2025-06-02 DIAGNOSIS — R00.2 PALPITATIONS: Primary | ICD-10-CM

## 2025-06-02 DIAGNOSIS — I47.10 PAROXYSMAL SVT (SUPRAVENTRICULAR TACHYCARDIA): ICD-10-CM

## 2025-06-02 DIAGNOSIS — E78.2 MIXED HYPERLIPIDEMIA: ICD-10-CM

## 2025-06-02 PROCEDURE — 99214 OFFICE O/P EST MOD 30 MIN: CPT | Performed by: HOSPITALIST

## 2025-06-02 PROCEDURE — 1160F RVW MEDS BY RX/DR IN RCRD: CPT | Performed by: HOSPITALIST

## 2025-06-02 PROCEDURE — G2211 COMPLEX E/M VISIT ADD ON: HCPCS | Performed by: HOSPITALIST

## 2025-06-02 PROCEDURE — 1159F MED LIST DOCD IN RCRD: CPT | Performed by: HOSPITALIST

## 2025-06-02 RX ORDER — FENOFIBRATE 145 MG/1
145 TABLET, FILM COATED ORAL DAILY
Qty: 90 TABLET | Refills: 3 | Status: SHIPPED | OUTPATIENT
Start: 2025-06-02

## 2025-06-02 RX ORDER — AMLODIPINE BESYLATE 5 MG/1
5 TABLET ORAL DAILY
Start: 2025-06-02

## 2025-06-02 NOTE — PROGRESS NOTES
[facility] Cardiology  [address], [address]  [place], [place], [address] [phone]  WWW.[facility]           OUTPATIENT CLINIC FOLLOW UP NOTE    Patient care team:  Patient Care Team:  Gloria Jovel PA as PCP - General (Physician Assistant)  Tony Hilario MD as PCP - Family Medicine  Rod Malone MD as Consulting Physician (Cardiology)  Christina Carr APRN as Nurse Practitioner (Cardiology)      Subjective:   Chief complaint:   Chief Complaint   Patient presents with    Palpitations         Alicia Vaca is a 69 y.o. female.  Cardiac focused problem list:  Palpitations, PVCs, PSVT  Initially noted after COVID infection 9/2024  Holter monitor, 7 day, 10/13/2024:  Predominant rhythm is NSR. 7 episodes of atrial tachycardia with longest lasting 10 beats.  1 episode of ventricular tachycardia lasting 4 beats. PVC burden 2.9%.   Echocardiogram 3/05/2025:  LVEF 56-60%. LA volume mildly increased. RA borderline dilated.   Hypertension   Lumbar degenerative disc disease   Depression     HPI:    Patient presents today for follow up. Blood pressure has been borderline low in the last 1-2 weeks.  Increased fatigue.  Remains active but napping longer in the afternoon.  Denies chest pain, shortness of breath or edema.     Review of Systems:  As noted above in the HPI    PFSH:  Patient Active Problem List   Diagnosis    Palpitations    PVC's (premature ventricular contractions)    Paroxysmal SVT (supraventricular tachycardia)         Current Outpatient Medications:     amLODIPine (NORVASC) 5 MG tablet, Take 1 tablet by mouth Daily., Disp: , Rfl:     Berberine Chloride (BERBERINE HCI PO), Take 1 capsule by mouth Daily., Disp: , Rfl:     Calcium 250 MG capsule, Take 1 capsule by mouth Daily., Disp: , Rfl:     Chromium 200 MCG capsule, Take  by mouth., Disp: , Rfl:     CINNAMON PO, Take  by mouth., Disp: , Rfl:     Digestive Aids Mixture (DIGESTION GB PO), Take  by mouth  Daily., Disp: , Rfl:     DIGESTIVE ENZYMES PO, Take 2 tablets by mouth 3 times a day., Disp: , Rfl:     estradiol (CLIMARA) 0.075 MG/24HR patch, Place 1 patch on the skin as directed by provider 2 (Two) Times a Week., Disp: , Rfl:     estradiol (ESTRACE) 0.1 MG/GM vaginal cream, Insert 2 g into the vagina 2 (Two) Times a Week., Disp: , Rfl:     folic acid-vit B6-vit B12 (FOLTABS) 0.8-10-0.115 MG tablet tablet, Take 1 tablet by mouth Daily., Disp: , Rfl:     MICHAEL PO, Take 1 capsule by mouth Daily., Disp: , Rfl:     Homeopathic Products (RAUWOLFIA COMPOSITUM PO), Take 1 capsule by mouth Daily., Disp: , Rfl:     lisinopril (PRINIVIL,ZESTRIL) 40 MG tablet, Take 1 tablet by mouth Every Night., Disp: , Rfl:     MAGNESIUM CHLORIDE PO, Take 2 capsules by mouth Daily., Disp: , Rfl:     meloxicam (MOBIC) 7.5 MG tablet, Take 1 tablet by mouth Every Night., Disp: , Rfl:     MILK THISTLE PO, Take 1 capsule by mouth Daily., Disp: , Rfl:     multivitamin with minerals tablet tablet, Take 1 tablet by mouth Daily., Disp: , Rfl:     nebivolol (Bystolic) 10 MG tablet, Take 1 tablet by mouth Daily., Disp: 30 tablet, Rfl: 11    NON FORMULARY, , Disp: , Rfl:     NON FORMULARY, , Disp: , Rfl:     NON FORMULARY, , Disp: , Rfl:     NON FORMULARY, , Disp: , Rfl:     NON FORMULARY, , Disp: , Rfl:     NON FORMULARY, , Disp: , Rfl:     NON FORMULARY, , Disp: , Rfl:     NON FORMULARY, Daily., Disp: , Rfl:     NON FORMULARY, Daily., Disp: , Rfl:     NON FORMULARY, Daily., Disp: , Rfl:     NON FORMULARY, Daily., Disp: , Rfl:     NON FORMULARY, Every Night., Disp: , Rfl:     NON FORMULARY, Every Night., Disp: , Rfl:     Omega-3 Fatty Acids (fish oil) 1000 MG capsule capsule, Take 1 capsule by mouth Daily With Breakfast., Disp: , Rfl:     Omega-3 Fatty Acids (SUPER TWIN EPA/DHA PO), Take 2 capsules by mouth Daily., Disp: , Rfl:     Probiotic Product (PROBIOTIC DAILY PO), Take 1 capsule by mouth., Disp: , Rfl:     Specialty Vitamins Products  "(INFLAMEX PO), Take  by mouth., Disp: , Rfl:     THYROID PO, Take 1 capsule by mouth., Disp: , Rfl:     vitamin D (ERGOCALCIFEROL) 1.25 MG (82338 UT) capsule capsule, Take 1 capsule by mouth Daily., Disp: , Rfl:     Zinc Sulfate (ZINC 15 PO), Take 1 capsule by mouth Daily., Disp: , Rfl:     fenofibrate (TRICOR) 145 MG tablet, Take 1 tablet by mouth Daily., Disp: 90 tablet, Rfl: 3    Allergies   Allergen Reactions    Penicillins Rash       Social History     Socioeconomic History    Marital status:    Tobacco Use    Smoking status: Former     Current packs/day: 0.00     Average packs/day: 1 pack/day for 13.0 years (13.0 ttl pk-yrs)     Types: Cigarettes     Start date:      Quit date:      Years since quittin.4     Passive exposure: Never    Smokeless tobacco: Never    Tobacco comments:     Quit 40 years ago   Vaping Use    Vaping status: Never Used   Substance and Sexual Activity    Alcohol use: Yes     Alcohol/week: 6.0 standard drinks of alcohol     Types: 6 Glasses of wine per week    Drug use: Never    Sexual activity: Not Currently     Partners: Male     Birth control/protection: Hysterectomy     Family History   Problem Relation Age of Onset    Asthma Mother     Hypertension Mother     Stroke Father     Hypertension Father     Breast cancer Sister 50    Hypertension Sister     Hypertension Brother     Ovarian cancer Neg Hx          Objective:   Physical Exam:  /70 (BP Location: Right arm, Patient Position: Sitting, Cuff Size: Adult)   Pulse 59   Ht 165.1 cm (65\")   Wt 68.9 kg (152 lb)   SpO2 99%   BMI 25.29 kg/m²   CONSTITUTIONAL: No acute distress  RESPIRATORY: Normal effort. Clear to auscultation bilaterally without wheezing or rales  CARDIOVASCULAR: Regular rate and rhythm with normal S1 and S2. Without murmur.  PERIPHERAL VASCULAR: No carotid bruit bilaterally.  Normal radial pulse.     Labs:  Labs reviewed by myself    Lab Results   Component Value Date    CHOL 239 (H) " "03/04/2020     Lab Results   Component Value Date    TRIG 60 03/04/2020     Lab Results   Component Value Date    HDL 82 (H) 03/04/2020     Lab Results   Component Value Date     (H) 03/04/2020     No components found for: \"LDLDIRECTC\"    PCP labs, 10/07/2024  CBC:  WBC 9.0, Hgb 13.2, Hct 40.3, plt 398  CMP:  Na 138, K 4.0, BUN 11, creatinine 0.51, AST 16, ALT 11,   Hemoglobin A1c:  5.4   Thyroid panel:  free T4 1.4, TSH 1.62  Lipid panel:  , HDL 74, , TG 72    Diagnostic Data:    Procedures    Results for orders placed during the hospital encounter of 03/05/25    Adult Transthoracic Echo Complete W/ Cont if Necessary Per Protocol    Interpretation Summary    Left ventricular systolic function is normal. Left ventricular ejection fraction appears to be 56 - 60%.    Left atrial volume is mildly increased.    The right atrial cavity is borderline dilated.      Assessment and Plan:     Palpitations  PVCs, NSVT  Paroxysmal SVT  (brief runs of atrial tach)  -Initial provocation following COVID infection.  Heart monitor reviewed.   -Echocardiogram with normal EF.   -Continue nebivolol.     Hypertension  -Continue nebivolol, lisinopril   -Decreasing amlodipine to 5 mg daily.  Patient to keep bp diary for the next 5-7 days and contact our office in about a week to report readings.  Additional medication changes as needed based on bp readings.     Dyslipidemia  -Coronary calcium score of 0  -LDL remains elevated.  Patient reluctanct to start a statin due to potential side effects.  Will trial Tricor 145 mg daily.    -Repeat lipid panel, LFTs in 8-12 weeks.  External lab form printed and given to patient today.   -Encouraged increased efforts towards heart healthy diet and regular exercise.     - Return in about 6 months (around 12/2/2025) for Next scheduled follow up with Dr. Malone .    Electronically signed by ANA Irving, 06/02/25, 3:36 PM EDT.    "

## 2025-06-02 NOTE — TELEPHONE ENCOUNTER
Pt called in today to report BP measurements prior to appointment.     123/63 (64)  96/53 (62)  117/67 (57)   113/60 (57)  109/62 (56)   91/55 (68)  94/57 (61)    Pt wanting to discuss what values are too low. Pt bringing cuff into appointment.

## 2025-06-09 ENCOUNTER — TELEPHONE (OUTPATIENT)
Dept: CARDIOLOGY | Facility: CLINIC | Age: 70
End: 2025-06-09
Payer: MEDICARE

## 2025-06-09 NOTE — TELEPHONE ENCOUNTER
Pt called to report BP as follows: 112/60 (57), 121/62 (63), 123/64 (64), 121/68 (54),.  121/65 (61)    Pt called and asked to continue current medication regimen. LVM. Pt to call back if with symptoms or spikes in BP

## 2025-07-28 RX ORDER — AMLODIPINE BESYLATE 5 MG/1
5 TABLET ORAL DAILY
Qty: 90 TABLET | Refills: 0 | OUTPATIENT
Start: 2025-07-28

## 2025-07-30 ENCOUNTER — TELEPHONE (OUTPATIENT)
Dept: CARDIOLOGY | Facility: CLINIC | Age: 70
End: 2025-07-30
Payer: MEDICARE

## 2025-07-30 RX ORDER — AMLODIPINE BESYLATE 5 MG/1
5 TABLET ORAL DAILY
Qty: 90 TABLET | Refills: 3 | Status: SHIPPED | OUTPATIENT
Start: 2025-07-30

## 2025-07-30 NOTE — TELEPHONE ENCOUNTER
Patient called regarding her BP and refills on her medications. Patient stated she has had some stressful things going on. These BP are from the last week.     161/82 HR 61  127/62 HR 60  130/71 HR 54  157/76 HR 59    Instructed patient to keep BP log for the next week and call us back to let us know as last month when she called her BP look great. We also spoke about stress management strategies.

## 2025-08-22 ENCOUNTER — TELEPHONE (OUTPATIENT)
Dept: CARDIOLOGY | Facility: CLINIC | Age: 70
End: 2025-08-22
Payer: MEDICARE

## 2025-08-22 RX ORDER — AMLODIPINE BESYLATE 10 MG/1
10 TABLET ORAL DAILY
Qty: 60 TABLET | OUTPATIENT
Start: 2025-08-22

## 2025-08-22 RX ORDER — LISINOPRIL 40 MG/1
40 TABLET ORAL NIGHTLY
Qty: 90 TABLET | Refills: 3 | Status: SHIPPED | OUTPATIENT
Start: 2025-08-22

## 2025-08-27 ENCOUNTER — TELEPHONE (OUTPATIENT)
Dept: CARDIOLOGY | Facility: CLINIC | Age: 70
End: 2025-08-27
Payer: MEDICARE

## 2025-08-27 DIAGNOSIS — E78.2 MIXED HYPERLIPIDEMIA: Primary | ICD-10-CM

## 2025-08-27 RX ORDER — ROSUVASTATIN CALCIUM 10 MG/1
10 TABLET, COATED ORAL DAILY
Qty: 90 TABLET | Refills: 3 | Status: SHIPPED | OUTPATIENT
Start: 2025-08-27 | End: 2025-08-29